# Patient Record
Sex: MALE | Race: OTHER | URBAN - METROPOLITAN AREA
[De-identification: names, ages, dates, MRNs, and addresses within clinical notes are randomized per-mention and may not be internally consistent; named-entity substitution may affect disease eponyms.]

---

## 2020-07-31 ENCOUNTER — APPOINTMENT (OUTPATIENT)
Dept: OTHER | Facility: HOSPITAL | Age: 55
End: 2020-07-31
Attending: EMERGENCY MEDICINE

## 2024-10-14 ENCOUNTER — OFFICE VISIT (OUTPATIENT)
Dept: OTOLARYNGOLOGY | Facility: CLINIC | Age: 59
End: 2024-10-14

## 2024-10-14 VITALS — HEIGHT: 69 IN | WEIGHT: 205 LBS | BODY MASS INDEX: 30.36 KG/M2

## 2024-10-14 DIAGNOSIS — H92.01 RIGHT EAR PAIN: Primary | ICD-10-CM

## 2024-10-14 DIAGNOSIS — R68.84 MANDIBLE PAIN: ICD-10-CM

## 2024-10-14 DIAGNOSIS — H61.23 BILATERAL IMPACTED CERUMEN: ICD-10-CM

## 2024-10-14 RX ORDER — AZELASTINE 1 MG/ML
2 SPRAY, METERED NASAL 2 TIMES DAILY
COMMUNITY
Start: 2024-08-24

## 2024-10-14 RX ORDER — CYCLOBENZAPRINE HCL 5 MG
5 TABLET ORAL NIGHTLY
Qty: 30 TABLET | Refills: 1 | Status: SHIPPED | OUTPATIENT
Start: 2024-10-14

## 2024-10-14 RX ORDER — DOXYCYCLINE 100 MG/1
CAPSULE ORAL
COMMUNITY
Start: 2024-08-24

## 2024-10-14 RX ORDER — FLUTICASONE PROPIONATE 50 MCG
2 SPRAY, SUSPENSION (ML) NASAL DAILY
COMMUNITY
Start: 2024-07-02

## 2024-10-14 RX ORDER — ERGOCALCIFEROL 1.25 MG/1
50000 CAPSULE, LIQUID FILLED ORAL WEEKLY
COMMUNITY
Start: 2024-09-18

## 2024-10-14 RX ORDER — CELECOXIB 200 MG/1
200 CAPSULE ORAL DAILY PRN
Qty: 30 CAPSULE | Refills: 0 | Status: SHIPPED | OUTPATIENT
Start: 2024-10-14 | End: 2024-11-13

## 2024-10-14 NOTE — PROGRESS NOTES
Musa Richardson is a 59 year old male.    Chief Complaint   Patient presents with    Sinus Problem     Patient is here due to Sinus infection.   Patient has brought in CT report.        HISTORY OF PRESENT ILLNESS  He presents with a history of right-sided facial pain and discomfort involving his lower jaw.  States that his lower teeth as well as his upper teeth hurt a bit.  He is gone so far as to have a tooth removed in the past due to this pain without any improvement in his symptoms.  He does admit to grinding his teeth and does wear a bite guard occasionally but has not used them recently.  He does have a long history of chronic sinus issues as a child.  Grew up in [place] states that he would see his doctor but was never sent to a specialist and had lots of ear infections treated with simple antibiotic injections.  No recent nasal mucopurulence.  Seen by his primary care physician who suspected a sinus problem treated him with antibiotics with some improvement in symptoms.  Had a CT scan performed that demonstrates chronic osteitis of multiple sinuses and changes consistent with long-term sinus inflammation.  Some mucosal thickening noted but no acute air-fluid levels other than perhaps some frothy material in one of the sphenoid sinuses.  No mucopurulence at this time.  No real congestive issues either.  No other signs, symptoms or complaints.  Sent by Denilson Puri for my opinion regarding his facial pain.      Social History     Socioeconomic History    Marital status: Unknown   Tobacco Use    Smoking status: Never    Smokeless tobacco: Never   Vaping Use    Vaping status: Never Used   Substance and Sexual Activity    Alcohol use: Yes    Drug use: Never       History reviewed. No pertinent family history.    History reviewed. No pertinent past medical history.    Past Surgical History:   Procedure Laterality Date    Appendectomy           REVIEW OF SYSTEMS    System Neg/Pos Details   Constitutional  Negative Fatigue, fever and weight loss.   ENMT Negative Drooling.   Eyes Negative Blurred vision and vision changes.   Respiratory Negative Dyspnea and wheezing.   Cardio Negative Chest pain, irregular heartbeat/palpitations and syncope.   GI Negative Abdominal pain and diarrhea.   Endocrine Negative Cold intolerance and heat intolerance.   Neuro Negative Tremors.   Psych Negative Anxiety and depression.   Integumentary Negative Frequent skin infections, pigment change and rash.   Hema/Lymph Negative Easy bleeding and easy bruising.           PHYSICAL EXAM    Ht 5' 9\" (1.753 m)   Wt 205 lb (93 kg)   BMI 30.27 kg/m²        Constitutional Normal Overall appearance - Normal.   Psychiatric Normal Orientation - Oriented to time, place, person & situation. Appropriate mood and affect.   Neck Exam Normal Inspection - Normal. Palpation - Normal. Parotid gland - Normal. Thyroid gland - Normal.   Eyes Normal Conjunctiva - Right: Normal, Left: Normal. Pupil - Right: Normal, Left: Normal. Fundus - Right: Normal, Left: Normal.   Neurological Normal Memory - Normal. Cranial nerves - Cranial nerves II through XII grossly intact.   Head/Face Normal Facial features - Normal. Eyebrows - Normal. Skull - Normal.        Nasopharynx Normal External nose - Normal. Lips/teeth/gums - Normal. Tonsils - Normal. Oropharynx - Normal.   Ears Normal Inspection - Right: Normal, Left: Normal. Canal - Right: Normal, Left: Normal. TM - Right: Normal, Left: Normal.   Skin Normal Inspection - Normal.        Lymph Detail Normal Submental. Submandibular. Anterior cervical. Posterior cervical. Supraclavicular.   TMJ  Tender to palpation on the right   Nose/Mouth/Throat Normal External nose - Normal. Lips/teeth-worn teeth/gums - Normal. Tonsils - Normal. Oropharynx - Normal.   Nose/Mouth/Throat Normal Nares - Right: Normal Left: Normal. Septum -Normal  Turbinates - Right: Normal, Left: Normal.       Current Outpatient Medications:     azelastine 0.1 %  Nasal Solution, 2 sprays by Nasal route 2 (two) times daily. (Patient not taking: Reported on 10/14/2024), Disp: , Rfl:     doxycycline 100 MG Oral Cap, Take 1 capsule twice a day by oral route for 21 days. (Patient not taking: Reported on 10/14/2024), Disp: , Rfl:     ergocalciferol 1.25 MG (92657 UT) Oral Cap, Take 1 capsule (50,000 Units total) by mouth once a week. (Patient not taking: Reported on 10/14/2024), Disp: , Rfl:     fluticasone propionate 50 MCG/ACT Nasal Suspension, 2 sprays by Nasal route daily. (Patient not taking: Reported on 10/14/2024), Disp: , Rfl:   ASSESSMENT AND PLAN    1. Right ear pain    2. Mandible pain  Ears cleaned of cerumen impaction bilaterally.  Normal exam.  Return to TMJ as well as masseter musculature on the right.  I did discuss with him that his CT scan findings are consistent with all long history of chronic sinus issues many years ago and is not likely related to his current symptoms of facial pain and discomfort.  All of his pain is primarily lateral at this time involving the temporoparietal region the ear and the mandible.  He does have a long history of grinding his teeth and has not been wearing his bite guard recently.  He does chew gum throughout the day and chews only on the right side for years.  We did discuss the fact that his symptoms are more consistent with a musculoskeletal process and perhaps a TMJ inflammatory issue.  I did ask him to start warm heat soft diet chewing both sides of mouth and to avoid gum and crunchy items such as peanuts and pork grinds.  He will try to use his bite guard on a daily basis and return to see me in 1 month for reevaluation.        This note was prepared using Dragon [employer] voice recognition dictation software. As a result errors may occur. When identified these errors have been corrected. While every attempt is made to correct errors during dictation discrepancies may still exist    Navneet Finch MD    10/14/2024    4:40  PM

## 2024-11-11 ENCOUNTER — OFFICE VISIT (OUTPATIENT)
Dept: OTOLARYNGOLOGY | Facility: CLINIC | Age: 59
End: 2024-11-11

## 2024-11-11 DIAGNOSIS — R68.84 MANDIBLE PAIN: Primary | ICD-10-CM

## 2024-11-11 DIAGNOSIS — H92.01 RIGHT EAR PAIN: ICD-10-CM

## 2024-11-11 PROCEDURE — 99213 OFFICE O/P EST LOW 20 MIN: CPT | Performed by: OTOLARYNGOLOGY

## 2024-11-11 RX ORDER — CELECOXIB 200 MG/1
200 CAPSULE ORAL DAILY PRN
Qty: 30 CAPSULE | Refills: 2 | Status: SHIPPED | OUTPATIENT
Start: 2024-11-11 | End: 2025-02-09

## 2024-11-11 RX ORDER — CYCLOBENZAPRINE HCL 5 MG
5 TABLET ORAL NIGHTLY
Qty: 30 TABLET | Refills: 2 | Status: SHIPPED | OUTPATIENT
Start: 2024-11-11

## 2024-11-11 NOTE — PROGRESS NOTES
Musa Richardson is a 59 year old male.    Chief Complaint   Patient presents with    Follow - Up     Reevaluation on mandible pain, pt reports improvements on his symptoms        HISTORY OF PRESENT ILLNESS  He presents with a history of right-sided facial pain and discomfort involving his lower jaw. States that his lower teeth as well as his upper teeth hurt a bit. He is gone so far as to have a tooth removed in the past due to this pain without any improvement in his symptoms. He does admit to grinding his teeth and does wear a bite guard occasionally but has not used them recently. He does have a long history of chronic sinus issues as a child. Grew up in [place] states that he would see his doctor but was never sent to a specialist and had lots of ear infections treated with simple antibiotic injections. No recent nasal mucopurulence. Seen by his primary care physician who suspected a sinus problem treated him with antibiotics with some improvement in symptoms. Had a CT scan performed that demonstrates chronic osteitis of multiple sinuses and changes consistent with long-term sinus inflammation. Some mucosal thickening noted but no acute air-fluid levels other than perhaps some frothy material in one of the sphenoid sinuses. No mucopurulence at this time. No real congestive issues either. No other signs, symptoms or complaints. Sent by Denilson Puri for my opinion regarding his facial pain.     11/11/24 I last saw him about a month ago and at that time he was started on Celebrex and cyclobenzaprine for what appeared to be TMJ related issues.  He is now using his bite guard on a daily basis and with the use of medications which she continues to use he has noted about 80% improvement in his facial pain discomfort.  He does note that sometimes he does have some facial pain over his right front teeth superiorly and to his stomach when he uses Viagra.  I did ask him to discuss this with his primary care physician to  see if there is an alternative to Viagra that he may use.  No other signs, symptoms complaints.  Using warm heat soft diet and chewing on both sides of the mouth is much as possible.      Social History     Socioeconomic History    Marital status:    Tobacco Use    Smoking status: Never    Smokeless tobacco: Never   Vaping Use    Vaping status: Never Used   Substance and Sexual Activity    Alcohol use: Yes    Drug use: Never       History reviewed. No pertinent family history.    History reviewed. No pertinent past medical history.    Past Surgical History:   Procedure Laterality Date    Appendectomy           REVIEW OF SYSTEMS    System Neg/Pos Details   Constitutional Negative Fatigue, fever and weight loss.   ENMT Negative Drooling.   Eyes Negative Blurred vision and vision changes.   Respiratory Negative Dyspnea and wheezing.   Cardio Negative Chest pain, irregular heartbeat/palpitations and syncope.   GI Negative Abdominal pain and diarrhea.   Endocrine Negative Cold intolerance and heat intolerance.   Neuro Negative Tremors.   Psych Negative Anxiety and depression.   Integumentary Negative Frequent skin infections, pigment change and rash.   Hema/Lymph Negative Easy bleeding and easy bruising.           PHYSICAL EXAM    There were no vitals taken for this visit.       Constitutional Normal Overall appearance - Normal.   Psychiatric Normal Orientation - Oriented to time, place, person & situation. Appropriate mood and affect.   Neck Exam Normal Inspection - Normal. Palpation - Normal. Parotid gland - Normal. Thyroid gland - Normal.   Eyes Normal Conjunctiva - Right: Normal, Left: Normal. Pupil - Right: Normal, Left: Normal. Fundus - Right: Normal, Left: Normal.   Neurological Normal Memory - Normal. Cranial nerves - Cranial nerves II through XII grossly intact.   Head/Face Normal Facial features - Normal. Eyebrows - Normal. Skull - Normal.        Nasopharynx Normal External nose - Normal. Lips/teeth/gums  - Normal. Tonsils - Normal. Oropharynx - Normal.   Ears Normal Inspection - Right: Normal, Left: Normal. Canal - Right: Normal, Left: Normal. TM - Right: Normal, Left: Normal.   Skin Normal Inspection - Normal.        Lymph Detail Normal Submental. Submandibular. Anterior cervical. Posterior cervical. Supraclavicular.        Nose/Mouth/Throat Normal External nose - Normal. Lips/teeth/gums - Normal. Tonsils - Normal. Oropharynx - Normal.   Nose/Mouth/Throat Normal Nares - Right: Normal Left: Normal. Septum -Normal  Turbinates - Right: Normal, Left: Normal.       Current Outpatient Medications:     celecoxib 200 MG Oral Cap, Take 1 capsule (200 mg total) by mouth daily as needed for Pain., Disp: 30 capsule, Rfl: 0    cyclobenzaprine 5 MG Oral Tab, Take 1 tablet (5 mg total) by mouth nightly., Disp: 30 tablet, Rfl: 1    azelastine 0.1 % Nasal Solution, 2 sprays by Nasal route 2 (two) times daily. (Patient not taking: Reported on 11/11/2024), Disp: , Rfl:     doxycycline 100 MG Oral Cap, Take 1 capsule twice a day by oral route for 21 days. (Patient not taking: Reported on 11/11/2024), Disp: , Rfl:     ergocalciferol 1.25 MG (89356 UT) Oral Cap, Take 1 capsule (50,000 Units total) by mouth once a week. (Patient not taking: Reported on 11/11/2024), Disp: , Rfl:     fluticasone propionate 50 MCG/ACT Nasal Suspension, 2 sprays by Nasal route daily. (Patient not taking: Reported on 11/11/2024), Disp: , Rfl:   ASSESSMENT AND PLAN    1. Mandible pain    2. Right ear pain  Continue with Celebrex and cyclobenzaprine but on a as needed basis now.  I have asked him to hold off on the use of medications if he is actually doing well.  Overall 80% better and now using his bite guard on a daily basis.  We discussed the fact that if he continues using the bite guard daily he will have to less need for the medications in the future.  Return to see me in 3 months for refills otherwise return to see me on a as needed basis.        This  note was prepared using Dragon Medical voice recognition dictation software. As a result errors may occur. When identified these errors have been corrected. While every attempt is made to correct errors during dictation discrepancies may still exist    Navneet Finch MD    11/11/2024    4:05 PM

## 2025-01-09 ENCOUNTER — TELEPHONE (OUTPATIENT)
Dept: OTOLARYNGOLOGY | Facility: CLINIC | Age: 60
End: 2025-01-09

## 2025-01-09 NOTE — TELEPHONE ENCOUNTER
Patient calling stating that he received a bill from last visit with Dr Finch 11/11/2024 that was not cover with his insurance per patient he spoke to billing and was told they need a code to resend again the claim?Please advise   Irish speaker

## 2025-01-14 NOTE — TELEPHONE ENCOUNTER
I called and spoke to someone in Billing that informed Musa that his 11/11/24 appt with Dr. Finch wasn't covered due to not having coverage on that day.  She said this was happening to many patients towards the end of the year and she called it being incorrectly denied.  He would need to call [employer] and tell them to re-process his claim for that visit.  He then needs to call our billing dept to have them reprocess the claim on our end.  No codes are needed for it to be reprocessed.     I called using  David # [id number] and left a voicemail message explaining the above information and instructions

## 2025-01-20 RX ORDER — CELECOXIB 200 MG/1
200 CAPSULE ORAL DAILY PRN
Qty: 30 CAPSULE | Refills: 0 | Status: SHIPPED
Start: 2025-01-20

## (undated) NOTE — LETTER
No referring provider defined for this encounter.       10/14/24        Patient: Musa Richardson   YOB: 1965   Date of Visit: 10/14/2024       Dear  Dr. Jaxson MD,      Thank you for referring Musa Richardson to my practice.  Please find my assessment and plan below.    ASSESSMENT AND PLAN    1. Right ear pain    2. Mandible pain  Ears cleaned of cerumen impaction bilaterally.  Normal exam.  Return to TMJ as well as masseter musculature on the right.  I did discuss with him that his CT scan findings are consistent with all long history of chronic sinus issues many years ago and is not likely related to his current symptoms of facial pain and discomfort.  All of his pain is primarily lateral at this time involving the temporoparietal region the ear and the mandible.  He does have a long history of grinding his teeth and has not been wearing his bite guard recently.  He does chew gum throughout the day and chews only on the right side for years.  We did discuss the fact that his symptoms are more consistent with a musculoskeletal process and perhaps a TMJ inflammatory issue.  I did ask him to start warm heat soft diet chewing both sides of mouth and to avoid gum and crunchy items such as peanuts and pork grinds.  He will try to use his bite guard on a daily basis and return to see me in 1 month for reevaluation.               Sincerely,   Navneet Finch MD   [facility] [address] [id number]    Document electronically generated by:  Navneet Finch MD